# Patient Record
Sex: FEMALE | Race: WHITE | NOT HISPANIC OR LATINO | URBAN - METROPOLITAN AREA
[De-identification: names, ages, dates, MRNs, and addresses within clinical notes are randomized per-mention and may not be internally consistent; named-entity substitution may affect disease eponyms.]

---

## 2018-02-21 ENCOUNTER — EMERGENCY (EMERGENCY)
Facility: HOSPITAL | Age: 28
LOS: 1 days | Discharge: HOME | End: 2018-02-21
Attending: EMERGENCY MEDICINE | Admitting: EMERGENCY MEDICINE
Payer: COMMERCIAL

## 2018-02-21 VITALS
OXYGEN SATURATION: 99 % | SYSTOLIC BLOOD PRESSURE: 130 MMHG | RESPIRATION RATE: 16 BRPM | DIASTOLIC BLOOD PRESSURE: 85 MMHG | HEART RATE: 71 BPM | TEMPERATURE: 98 F
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
## 2018-02-21 DIAGNOSIS — S09.90XA UNSPECIFIED INJURY OF HEAD, INITIAL ENCOUNTER: ICD-10-CM
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
## 2018-02-21 DIAGNOSIS — S06.0X0A CONCUSSION WITHOUT LOSS OF CONSCIOUSNESS, INITIAL ENCOUNTER: ICD-10-CM

## 2018-02-21 DIAGNOSIS — Z88.0 ALLERGY STATUS TO PENICILLIN: ICD-10-CM

## 2018-02-21 PROCEDURE — 70450 CT HEAD/BRAIN W/O DYE: CPT | Mod: 26

## 2018-02-21 PROCEDURE — 99284 EMERGENCY DEPT VISIT MOD MDM: CPT

## 2018-02-21 RX ORDER — ONDANSETRON 8 MG/1
1 TABLET, FILM COATED ORAL
Qty: 30 | Refills: 2 | OUTPATIENT
Start: 2018-02-21 | End: 2018-03-22

## 2018-02-21 RX ORDER — ONDANSETRON 8 MG/1
4 TABLET, FILM COATED ORAL ONCE
Qty: 0 | Refills: 0 | Status: COMPLETED | OUTPATIENT
Start: 2018-02-21 | End: 2018-02-21

## 2018-02-21 RX ADMIN — ONDANSETRON 4 MILLIGRAM(S): 8 TABLET, FILM COATED ORAL at 09:26

## 2018-02-21 NOTE — ED ADULT TRIAGE NOTE - CHIEF COMPLAINT QUOTE
c/o headache, nausea and fatigue after falling at high speed while skiing Monday, was wearing a helmet

## 2018-02-21 NOTE — ED PROVIDER NOTE - ENMT, MLM
Airway patent, Nasal mucosa clear. Mouth with normal mucosa. Throat has no vesicles, no oropharyngeal exudates and uvula is midline. Airway patent, Nasal mucosa clear. MMM

## 2018-02-21 NOTE — ED ADULT NURSE NOTE - CHPI ED SYMPTOMS NEG
no numbness/no confusion/no blurred vision/no dizziness/no loss of consciousness/no fever/no weakness/no change in level of consciousness/no vomiting

## 2018-02-21 NOTE — ED PROVIDER NOTE - MEDICAL DECISION MAKING DETAILS
26y/o s/p fall 2 days ago while skiing, patient reports wearing a helmet and denies LOC. Patient states she feels "foggy" and "unbalanced" and denies any visual impairments. Will do CT head. 26y/o s/p fall 2 days ago while skiing, patient reports wearing a helmet and denies LOC. Patient states she feels "foggy" and "unbalanced" and denies any visual impairments. Will do CT head. Sx most consistent with concussion.

## 2018-02-21 NOTE — ED PROVIDER NOTE - OBJECTIVE STATEMENT
26y/o female was skiing Monday (02/19/2019) wearing a helmet when she fell, rolling several times c/o "feeling foggy" and "unbalanced". Patient state she did not have LOC and remembers the entire episode.

## 2024-01-11 NOTE — ED ADULT TRIAGE NOTE - HEART RATE (BEATS/MIN)
[FreeTextEntry1] : Appropriate labs were drawn in this office today. All preventative measures were reviewed with the patient and the patient is due for and agrees to the following as outlined  in the plan  below.
71